# Patient Record
Sex: FEMALE | Race: BLACK OR AFRICAN AMERICAN | NOT HISPANIC OR LATINO
[De-identification: names, ages, dates, MRNs, and addresses within clinical notes are randomized per-mention and may not be internally consistent; named-entity substitution may affect disease eponyms.]

---

## 2017-01-16 ENCOUNTER — RESULT REVIEW (OUTPATIENT)
Age: 47
End: 2017-01-16

## 2018-01-24 ENCOUNTER — RESULT REVIEW (OUTPATIENT)
Age: 48
End: 2018-01-24

## 2018-10-02 ENCOUNTER — APPOINTMENT (OUTPATIENT)
Dept: OTHER | Facility: CLINIC | Age: 48
End: 2018-10-02
Payer: COMMERCIAL

## 2018-10-02 ENCOUNTER — LABORATORY RESULT (OUTPATIENT)
Age: 48
End: 2018-10-02

## 2018-10-02 ENCOUNTER — TRANSCRIPTION ENCOUNTER (OUTPATIENT)
Age: 48
End: 2018-10-02

## 2018-10-02 VITALS
RESPIRATION RATE: 15 BRPM | WEIGHT: 181 LBS | SYSTOLIC BLOOD PRESSURE: 133 MMHG | HEIGHT: 67 IN | BODY MASS INDEX: 28.41 KG/M2 | OXYGEN SATURATION: 100 % | DIASTOLIC BLOOD PRESSURE: 84 MMHG | HEART RATE: 79 BPM

## 2018-10-02 DIAGNOSIS — I10 ESSENTIAL (PRIMARY) HYPERTENSION: ICD-10-CM

## 2018-10-02 DIAGNOSIS — Z28.89 IMMUNIZATION NOT CARRIED OUT FOR OTHER REASON: ICD-10-CM

## 2018-10-02 DIAGNOSIS — E03.9 HYPOTHYROIDISM, UNSPECIFIED: ICD-10-CM

## 2018-10-02 PROCEDURE — 99386 PREV VISIT NEW AGE 40-64: CPT

## 2018-10-02 PROCEDURE — 96150: CPT

## 2018-10-02 PROCEDURE — 94060 EVALUATION OF WHEEZING: CPT

## 2018-10-03 LAB
ALBUMIN SERPL ELPH-MCNC: 4.3 G/DL
ALP BLD-CCNC: 80 U/L
ALT SERPL-CCNC: 10 U/L
ANION GAP SERPL CALC-SCNC: 14 MMOL/L
APPEARANCE: CLEAR
AST SERPL-CCNC: 18 U/L
BACTERIA: NEGATIVE
BASOPHILS # BLD AUTO: 0.02 K/UL
BASOPHILS NFR BLD AUTO: 0.9 %
BILIRUB SERPL-MCNC: 0.4 MG/DL
BILIRUBIN URINE: NEGATIVE
BLOOD URINE: NEGATIVE
BUN SERPL-MCNC: 10 MG/DL
CALCIUM SERPL-MCNC: 9.6 MG/DL
CHLORIDE SERPL-SCNC: 104 MMOL/L
CHOLEST SERPL-MCNC: 213 MG/DL
CHOLEST/HDLC SERPL: 2.6 RATIO
CO2 SERPL-SCNC: 21 MMOL/L
COLOR: YELLOW
CREAT SERPL-MCNC: 0.82 MG/DL
EOSINOPHIL # BLD AUTO: 0.02 K/UL
EOSINOPHIL NFR BLD AUTO: 0.9 %
GLUCOSE QUALITATIVE U: NEGATIVE MG/DL
GLUCOSE SERPL-MCNC: 87 MG/DL
HCT VFR BLD CALC: 36.1 %
HDLC SERPL-MCNC: 82 MG/DL
HGB BLD-MCNC: 12 G/DL
HYALINE CASTS: 0 /LPF
IMM GRANULOCYTES NFR BLD AUTO: 0 %
KETONES URINE: NEGATIVE
LDLC SERPL CALC-MCNC: 120 MG/DL
LEUKOCYTE ESTERASE URINE: NEGATIVE
LYMPHOCYTES # BLD AUTO: 1.06 K/UL
LYMPHOCYTES NFR BLD AUTO: 46.7 %
MAN DIFF?: NORMAL
MCHC RBC-ENTMCNC: 29.9 PG
MCHC RBC-ENTMCNC: 33.2 GM/DL
MCV RBC AUTO: 90 FL
MICROSCOPIC-UA: NORMAL
MONOCYTES # BLD AUTO: 0.46 K/UL
MONOCYTES NFR BLD AUTO: 20.3 %
NEUTROPHILS # BLD AUTO: 0.71 K/UL
NEUTROPHILS NFR BLD AUTO: 31.2 %
NITRITE URINE: NEGATIVE
PH URINE: 7.5
PLATELET # BLD AUTO: 235 K/UL
POTASSIUM SERPL-SCNC: 4.2 MMOL/L
PROT SERPL-MCNC: 7.7 G/DL
PROTEIN URINE: ABNORMAL MG/DL
RBC # BLD: 4.01 M/UL
RBC # FLD: 15.1 %
RED BLOOD CELLS URINE: 8 /HPF
SODIUM SERPL-SCNC: 139 MMOL/L
SPECIFIC GRAVITY URINE: 1.03
SQUAMOUS EPITHELIAL CELLS: 12 /HPF
TRIGL SERPL-MCNC: 53 MG/DL
UROBILINOGEN URINE: NEGATIVE MG/DL
WBC # FLD AUTO: 2.27 K/UL
WHITE BLOOD CELLS URINE: 5 /HPF

## 2019-01-04 ENCOUNTER — RECORD ABSTRACTING (OUTPATIENT)
Age: 49
End: 2019-01-04

## 2019-01-04 DIAGNOSIS — Z83.3 FAMILY HISTORY OF DIABETES MELLITUS: ICD-10-CM

## 2019-01-04 LAB — CYTOLOGY CVX/VAG DOC THIN PREP: NORMAL

## 2019-01-29 ENCOUNTER — RESULT CHARGE (OUTPATIENT)
Age: 49
End: 2019-01-29

## 2019-01-29 ENCOUNTER — APPOINTMENT (OUTPATIENT)
Dept: OBGYN | Facility: CLINIC | Age: 49
End: 2019-01-29
Payer: COMMERCIAL

## 2019-01-29 VITALS
WEIGHT: 189 LBS | HEIGHT: 67 IN | BODY MASS INDEX: 29.66 KG/M2 | SYSTOLIC BLOOD PRESSURE: 126 MMHG | DIASTOLIC BLOOD PRESSURE: 80 MMHG

## 2019-01-29 DIAGNOSIS — Z87.42 PERSONAL HISTORY OF OTHER DISEASES OF THE FEMALE GENITAL TRACT: ICD-10-CM

## 2019-01-29 DIAGNOSIS — Z04.9 ENCOUNTER FOR EXAMINATION AND OBSERVATION FOR UNSPECIFIED REASON: ICD-10-CM

## 2019-01-29 LAB
BILIRUB UR QL STRIP: ABNORMAL
CLARITY UR: CLEAR
COLLECTION METHOD: NORMAL
GLUCOSE UR-MCNC: NORMAL
HCG UR QL: 0.2 EU/DL
HCG UR QL: NEGATIVE
HGB UR QL STRIP.AUTO: NORMAL
KETONES UR-MCNC: ABNORMAL
LEUKOCYTE ESTERASE UR QL STRIP: NORMAL
NITRITE UR QL STRIP: NORMAL
PH UR STRIP: 6
PROT UR STRIP-MCNC: ABNORMAL
QUALITY CONTROL: YES
SP GR UR STRIP: 1.02

## 2019-01-29 PROCEDURE — 99213 OFFICE O/P EST LOW 20 MIN: CPT | Mod: 25

## 2019-01-29 PROCEDURE — 81003 URINALYSIS AUTO W/O SCOPE: CPT | Mod: QW

## 2019-01-29 PROCEDURE — 82270 OCCULT BLOOD FECES: CPT

## 2019-01-29 PROCEDURE — 36415 COLL VENOUS BLD VENIPUNCTURE: CPT

## 2019-01-29 PROCEDURE — 99396 PREV VISIT EST AGE 40-64: CPT

## 2019-01-29 PROCEDURE — 81025 URINE PREGNANCY TEST: CPT

## 2019-01-29 RX ORDER — THYROID, PORCINE 30 MG/1
30 TABLET ORAL
Refills: 0 | Status: COMPLETED | COMMUNITY
Start: 2018-10-02 | End: 2019-01-29

## 2019-01-29 RX ORDER — LISINOPRIL AND HYDROCHLOROTHIAZIDE TABLETS 20; 12.5 MG/1; MG/1
20-12.5 TABLET ORAL
Refills: 0 | Status: COMPLETED | COMMUNITY
Start: 2018-10-02 | End: 2019-01-29

## 2019-01-30 LAB
C TRACH RRNA SPEC QL NAA+PROBE: NOT DETECTED
HAV IGM SER QL: NONREACTIVE
HBV CORE IGM SER QL: NONREACTIVE
HBV SURFACE AG SER QL: NONREACTIVE
HCV AB SER QL: NONREACTIVE
HCV S/CO RATIO: 0.13 S/CO
HIV1+2 AB SPEC QL IA.RAPID: NONREACTIVE
HPV HIGH+LOW RISK DNA PNL CVX: NOT DETECTED
HSV 1+2 IGG SER IA-IMP: NEGATIVE
HSV 1+2 IGG SER IA-IMP: POSITIVE
HSV1 IGG SER QL: 46.4 INDEX
HSV2 IGG SER QL: 0.09 INDEX
N GONORRHOEA RRNA SPEC QL NAA+PROBE: NOT DETECTED
SOURCE AMPLIFICATION: NORMAL
SOURCE TP AMPLIFICATION: NORMAL
T PALLIDUM AB SER QL IA: NEGATIVE
T VAGINALIS RRNA SPEC QL NAA+PROBE: NOT DETECTED

## 2019-01-30 NOTE — COUNSELING
[Breast Self Exam] : breast self exam [Nutrition] : nutrition [Exercise] : exercise [Vitamins/Supplements] : vitamins/supplements [Contraception] : contraception [Sunscreen] : sunscreen [Safe Sexual Practices] : safe sexual practices [Weight Management] : weight management [STD (testing, results, tx)] : STD (testing, results, tx)

## 2019-01-30 NOTE — PHYSICAL EXAM
[Awake] : awake [Alert] : alert [Soft] : soft [Oriented x3] : oriented to person, place, and time [No Bleeding] : there was no active vaginal bleeding [Uterine Adnexae] : were not tender and not enlarged [Normal rectal exam] : was normal [Normal Brown Stool] : was normal and brown [None] : there was no rectal mass  [Labia Majora] : labia major [Labia Minora] : labia minora [Normal] : clitoris [No Tenderness] : no rectal tenderness [Nl Sphincter Tone] : normal sphincter tone [Enlarged ___ wks] : enlarged [unfilled] ~Uweeks [Acute Distress] : no acute distress [Mass] : no breast mass [Nipple Discharge] : no nipple discharge [Axillary LAD] : no axillary lymphadenopathy [Tender] : non tender [Occult Blood] : occult blood test from digital rectal exam was negative [Internal Hemorrhoid] : no internal hemorrhoids were present [External Hemorrhoid] : no external hemorrhoids were present [Skin Tags] : no residual hemorrhoidal skin tags [FreeTextEntry6] : normal

## 2019-01-30 NOTE — DISCUSSION/SUMMARY
[Condoms] : condom use [Pregnancy Prevention] : pregnancy prevention [FreeTextEntry1] : Pt will schedule pelvic sono to monitor fibroid size, along with further evaluate on and off left abdominal/pelvic region discomfort. Pt to RTO annual yearly.  All the pt's questions and concerns were addressed.

## 2019-01-30 NOTE — HISTORY OF PRESENT ILLNESS
[Current] : cancer screening reviewed and current [Last Pap ___] : Last cervical pap smear was [unfilled] [Last Pap Smear ___] : last Papanicolaou cytology done [unfilled] [Last Mammogram ___] : last mammogram done [unfilled] [Sexually Active] : is sexually active [Monogamous] : is monogamous [Contraception] : uses contraception [Male ___] : [unfilled] male [Definite:  ___ (Date)] : the last menstrual period was [unfilled] [Menarche Age: ____] : age at menarche was [unfilled] [de-identified] : [facility] 1/24/18 LISAL [de-identified] : [facility] 1/24/18 NEG [de-identified] : [facility] 1/18/18 B2

## 2019-02-01 LAB — CYTOLOGY CVX/VAG DOC THIN PREP: NORMAL

## 2019-02-12 ENCOUNTER — OTHER (OUTPATIENT)
Age: 49
End: 2019-02-12

## 2019-02-12 ENCOUNTER — APPOINTMENT (OUTPATIENT)
Dept: OBGYN | Facility: CLINIC | Age: 49
End: 2019-02-12
Payer: COMMERCIAL

## 2019-02-12 VITALS
BODY MASS INDEX: 29.74 KG/M2 | SYSTOLIC BLOOD PRESSURE: 120 MMHG | WEIGHT: 189.5 LBS | HEIGHT: 67 IN | DIASTOLIC BLOOD PRESSURE: 76 MMHG

## 2019-02-12 PROCEDURE — 99213 OFFICE O/P EST LOW 20 MIN: CPT | Mod: 25

## 2019-02-12 PROCEDURE — 76830 TRANSVAGINAL US NON-OB: CPT | Mod: 59

## 2019-02-12 PROCEDURE — 76857 US EXAM PELVIC LIMITED: CPT

## 2019-02-12 NOTE — HISTORY OF PRESENT ILLNESS
[Last Pap ___] : Last cervical pap smear was [unfilled] [Last Mammogram ___] : Last Mammogram was [unfilled] [Definite:  ___ (Date)] : the last menstrual period was [unfilled] [Normal Amount/Duration] : was of a normal amount and duration [Regular Cycle Intervals] : periods have been regular [Menarche Age: ____] : age at menarche was [unfilled] [Monogamous] : is monogamous [Male ___] : [unfilled] male [de-identified] : [facility] 2/12/19 [Sexually Active] : is not sexually active [Contraception] : does not use contraception

## 2019-02-12 NOTE — DISCUSSION/SUMMARY
[FreeTextEntry1] : Pt aware of results of recent pelvic sono, stable fibroids.  Discussed recent STD screen results.  Pt to RTO annual 1 year.  All the pt's questions and concerns were addressed.

## 2019-02-12 NOTE — COUNSELING
[STD (testing, results, tx)] : STD (testing, results, tx) [FreeTextEntry2] : Pt informed recent results, negative.

## 2019-02-25 ENCOUNTER — RX RENEWAL (OUTPATIENT)
Age: 49
End: 2019-02-25

## 2019-02-25 ENCOUNTER — TRANSCRIPTION ENCOUNTER (OUTPATIENT)
Age: 49
End: 2019-02-25

## 2019-04-19 ENCOUNTER — TRANSCRIPTION ENCOUNTER (OUTPATIENT)
Age: 49
End: 2019-04-19

## 2019-09-03 ENCOUNTER — APPOINTMENT (OUTPATIENT)
Dept: OTHER | Facility: CLINIC | Age: 49
End: 2019-09-03
Payer: COMMERCIAL

## 2019-09-03 ENCOUNTER — LABORATORY RESULT (OUTPATIENT)
Age: 49
End: 2019-09-03

## 2019-09-03 VITALS
OXYGEN SATURATION: 97 % | WEIGHT: 185 LBS | HEART RATE: 66 BPM | RESPIRATION RATE: 16 BRPM | SYSTOLIC BLOOD PRESSURE: 133 MMHG | DIASTOLIC BLOOD PRESSURE: 83 MMHG | HEIGHT: 67 IN | BODY MASS INDEX: 29.03 KG/M2

## 2019-09-03 PROCEDURE — 99212 OFFICE O/P EST SF 10 MIN: CPT | Mod: 25

## 2019-09-03 PROCEDURE — 99396 PREV VISIT EST AGE 40-64: CPT | Mod: 25

## 2019-09-03 PROCEDURE — 94010 BREATHING CAPACITY TEST: CPT

## 2019-09-03 NOTE — HISTORY OF PRESENT ILLNESS
[FreeTextEntry1] : heartburn \par taking PPI \par  also PRN Zantrac \par  still has reflux though better with meds\par  no abd pain \par  no dysphagia \par  had EGD 10 02 2018

## 2019-09-03 NOTE — DISCUSSION/SUMMARY
[Patient seen for WTC Monitoring ___] : Patient was seen for WTC monitoring [unfilled] [Please See Note in Chart and Documentation in Trial DB] : Please see note in chart and documentation in Trial DB. 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\par PMH/ PSH LM removal \par Hypothyroid \par HTN\par CHEST TIGHTNESS AND BURNING IN THE CHEST \par  SOUR TASTE IN THE MOUTH \par ALL STARTED 02 2002\par HE SX BECAME WORSE AR 2007 WHEN SHE STARTED TAKING OTC NEXIUM ONCE A WEEK \par EVENTUALLY SAW GI DOCTOR IN 2014 AND WAS DIAGNOSED WITH GERD  AND STOMACH ULCER \par  SHE IS TREATED WITH OMEPRAZOLE SINCE 03 2016 AND RANITIDINE 02 2018 \par \par CHEST CT 02 2014 NL MAMMOGRAM 02 2018 stated that had another one this year \par SPIROMETRY NL \par  PAP Smear 02 2018 \par \par Meds: entered in [employer] \par  Non smoker \par a/p WTC MV \par  see Follow up Oc MEd note for details \par

## 2019-09-03 NOTE — PHYSICAL EXAM
[General Appearance - Alert] : alert [General Appearance - In No Acute Distress] : in no acute distress [Sclera] : the sclera and conjunctiva were normal [PERRL With Normal Accommodation] : pupils were equal in size, round, and reactive to light [Extraocular Movements] : extraocular movements were intact [Outer Ear] : the ears and nose were normal in appearance [Oropharynx] : the oropharynx was normal [Neck Appearance] : the appearance of the neck was normal [Jugular Venous Distention Increased] : there was no jugular-venous distention [Neck Cervical Mass (___cm)] : no neck mass was observed [Thyroid Nodule] : there were no palpable thyroid nodules [Thyroid Diffuse Enlargement] : the thyroid was not enlarged [Auscultation Breath Sounds / Voice Sounds] : lungs were clear to auscultation bilaterally [Bowel Sounds] : normal bowel sounds [Abdomen Tenderness] : non-tender [Abdomen Soft] : soft [] : no hepato-splenomegaly [Abdomen Mass (___ Cm)] : no abdominal mass palpated

## 2019-09-03 NOTE — DISCUSSION/SUMMARY
[Patient seen for WTC Monitoring ___] : Patient was seen for WTC monitoring [unfilled] [Please See Note in Chart and Documentation in Trial DB] : Please see note in chart and documentation in Trial DB. [FreeTextEntry3] : ELYSE LAMB \par  at [facility]\par WTC exposure 	 Sep--	 Oct-\par \par         \par  	              Sunday Monday Tuesday Wed Thur Friday	Saturday\par   Sept 9- Sept 15	   	   	   	   	   	   	   \par  Sept 16 - Sept 22	   	 15	 15	 15	 15	 15	 15\par  Sept 23 - Sept 29	 0	 0	 15	 0	 15	 0	 15\par 4 days in 10 2001 15 hours \par  Doing  perimeter security and WBB adjacent to pile/pit\par PMH/ PSH LM removal \par Hypothyroid \par HTN\par CHEST TIGHTNESS AND BURNING IN THE CHEST \par  SOUR TASTE IN THE MOUTH \par ALL STARTED 02 2002\par HE SX BECAME WORSE AR 2007 WHEN SHE STARTED TAKING OTC NEXIUM ONCE A WEEK \par EVENTUALLY SAW GI DOCTOR IN 2014 AND WAS DIAGNOSED WITH GERD  AND STOMACH ULCER \par  SHE IS TREATED WITH OMEPRAZOLE SINCE 03 2016 AND RANITIDINE 02 2018 \par \par CHEST CT 02 2014 NL MAMMOGRAM 02 2018 stated that had another one this year \par SPIROMETRY NL \par  PAP Smear 02 2018 \par \par Meds: entered in [employer] \par  Non smoker \par a/p WTC MV \par  see Follow up Oc MEd note for details \par

## 2019-09-03 NOTE — PHYSICAL EXAM
[General Appearance - Alert] : alert [General Appearance - In No Acute Distress] : in no acute distress [Sclera] : the sclera and conjunctiva were normal [PERRL With Normal Accommodation] : pupils were equal in size, round, and reactive to light [Extraocular Movements] : extraocular movements were intact [Oropharynx] : the oropharynx was normal [Outer Ear] : the ears and nose were normal in appearance [Neck Appearance] : the appearance of the neck was normal [Neck Cervical Mass (___cm)] : no neck mass was observed [Jugular Venous Distention Increased] : there was no jugular-venous distention [Thyroid Diffuse Enlargement] : the thyroid was not enlarged [Thyroid Nodule] : there were no palpable thyroid nodules [Auscultation Breath Sounds / Voice Sounds] : lungs were clear to auscultation bilaterally [Bowel Sounds] : normal bowel sounds [Abdomen Tenderness] : non-tender [Abdomen Soft] : soft [] : no hepato-splenomegaly [Abdomen Mass (___ Cm)] : no abdominal mass palpated

## 2019-09-04 LAB
ALBUMIN SERPL ELPH-MCNC: 4.2 G/DL
ALP BLD-CCNC: 80 U/L
ALT SERPL-CCNC: 16 U/L
ANION GAP SERPL CALC-SCNC: 15 MMOL/L
APPEARANCE: CLEAR
AST SERPL-CCNC: 23 U/L
BACTERIA: NEGATIVE
BASOPHILS # BLD AUTO: 0.02 K/UL
BASOPHILS NFR BLD AUTO: 0.7 %
BILIRUB SERPL-MCNC: 0.2 MG/DL
BILIRUBIN URINE: NEGATIVE
BLOOD URINE: NEGATIVE
BUN SERPL-MCNC: 13 MG/DL
CALCIUM SERPL-MCNC: 9.7 MG/DL
CHLORIDE SERPL-SCNC: 101 MMOL/L
CHOLEST SERPL-MCNC: 186 MG/DL
CHOLEST/HDLC SERPL: 2.4 RATIO
CO2 SERPL-SCNC: 23 MMOL/L
COLOR: NORMAL
CREAT SERPL-MCNC: 0.78 MG/DL
EOSINOPHIL # BLD AUTO: 0.05 K/UL
EOSINOPHIL NFR BLD AUTO: 1.8 %
GLUCOSE QUALITATIVE U: NEGATIVE
GLUCOSE SERPL-MCNC: 81 MG/DL
HCT VFR BLD CALC: 38.2 %
HDLC SERPL-MCNC: 78 MG/DL
HGB BLD-MCNC: 11.9 G/DL
HYALINE CASTS: 1 /LPF
IMM GRANULOCYTES NFR BLD AUTO: 0.4 %
KETONES URINE: NEGATIVE
LDLC SERPL CALC-MCNC: 100 MG/DL
LEUKOCYTE ESTERASE URINE: NEGATIVE
LYMPHOCYTES # BLD AUTO: 1.41 K/UL
LYMPHOCYTES NFR BLD AUTO: 49.8 %
MAN DIFF?: NORMAL
MCHC RBC-ENTMCNC: 30.6 PG
MCHC RBC-ENTMCNC: 31.2 GM/DL
MCV RBC AUTO: 98.2 FL
MICROSCOPIC-UA: NORMAL
MONOCYTES # BLD AUTO: 0.35 K/UL
MONOCYTES NFR BLD AUTO: 12.4 %
NEUTROPHILS # BLD AUTO: 0.99 K/UL
NEUTROPHILS NFR BLD AUTO: 34.9 %
NITRITE URINE: NEGATIVE
PH URINE: 6.5
PLATELET # BLD AUTO: 221 K/UL
POTASSIUM SERPL-SCNC: 4.1 MMOL/L
PROT SERPL-MCNC: 7.7 G/DL
PROTEIN URINE: NEGATIVE
RBC # BLD: 3.89 M/UL
RBC # FLD: 13.7 %
RED BLOOD CELLS URINE: 1 /HPF
SODIUM SERPL-SCNC: 139 MMOL/L
SPECIFIC GRAVITY URINE: 1.02
SQUAMOUS EPITHELIAL CELLS: 1 /HPF
TRIGL SERPL-MCNC: 42 MG/DL
UROBILINOGEN URINE: NORMAL
WBC # FLD AUTO: 2.83 K/UL
WHITE BLOOD CELLS URINE: 0 /HPF

## 2019-10-02 ENCOUNTER — APPOINTMENT (OUTPATIENT)
Dept: OBGYN | Facility: CLINIC | Age: 49
End: 2019-10-02
Payer: COMMERCIAL

## 2019-10-02 VITALS
BODY MASS INDEX: 29.03 KG/M2 | WEIGHT: 185 LBS | HEIGHT: 67 IN | DIASTOLIC BLOOD PRESSURE: 82 MMHG | SYSTOLIC BLOOD PRESSURE: 122 MMHG

## 2019-10-02 LAB
BILIRUB UR QL STRIP: NORMAL
GLUCOSE UR-MCNC: NORMAL
HCG UR QL: 0.2 EU/DL
HGB UR QL STRIP.AUTO: NORMAL
KETONES UR-MCNC: NORMAL
LEUKOCYTE ESTERASE UR QL STRIP: ABNORMAL
NITRITE UR QL STRIP: NORMAL
PH UR STRIP: 7.5
PROT UR STRIP-MCNC: NORMAL
SP GR UR STRIP: 1.01

## 2019-10-02 PROCEDURE — 81003 URINALYSIS AUTO W/O SCOPE: CPT | Mod: QW

## 2019-10-02 PROCEDURE — 99213 OFFICE O/P EST LOW 20 MIN: CPT

## 2019-10-02 NOTE — PHYSICAL EXAM
[Normal] : uterus [Discharge] : a  ~M vaginal discharge was present [Heavy] : heavy [Foul Smelling] : foul smelling [Curds] : curd-like [No Bleeding] : there was no active vaginal bleeding [Uterine Adnexae] : were not tender and not enlarged

## 2019-10-02 NOTE — HISTORY OF PRESENT ILLNESS
[___ Year(s) Ago] : [unfilled] year(s) ago [Good] : being in good health [Healthy Diet] : a healthy diet [Regular Exercise] : regular exercise [Last Mammogram ___] : Last Mammogram was [unfilled] [Last Pap ___] : Last cervical pap smear was [unfilled] [Perimenopausal] : is perimenopausal [Pregnancy History] : pregnancy history: [Definite:  ___ (Date)] : the last menstrual period was [unfilled] [Menarche Age: ____] : age at menarche was [unfilled] [Sexually Active] : is sexually active [Monogamous] : is monogamous [Male ___] : [unfilled] male [Menstrual Problems] : reports normal menses [de-identified] : breast u/s-1/18/18 b2 [Contraception] : does not use contraception

## 2019-10-02 NOTE — COUNSELING
[STD (testing, results, tx)] : STD (testing, results, tx) [Contraception] : contraception [Safe Sexual Practices] : safe sexual practices [Vulvar Hygiene] : vulvar hygiene

## 2019-10-08 LAB
C TRACH RRNA SPEC QL NAA+PROBE: NOT DETECTED
CANDIDA VAG CYTO: NOT DETECTED
G VAGINALIS+PREV SP MTYP VAG QL MICRO: NOT DETECTED
N GONORRHOEA RRNA SPEC QL NAA+PROBE: NOT DETECTED
SOURCE AMPLIFICATION: NORMAL
T VAGINALIS VAG QL WET PREP: NOT DETECTED

## 2020-02-06 ENCOUNTER — APPOINTMENT (OUTPATIENT)
Dept: OBGYN | Facility: CLINIC | Age: 50
End: 2020-02-06
Payer: COMMERCIAL

## 2020-02-06 ENCOUNTER — ASOB RESULT (OUTPATIENT)
Age: 50
End: 2020-02-06

## 2020-02-06 VITALS
HEIGHT: 67 IN | SYSTOLIC BLOOD PRESSURE: 126 MMHG | WEIGHT: 186 LBS | DIASTOLIC BLOOD PRESSURE: 72 MMHG | BODY MASS INDEX: 29.19 KG/M2

## 2020-02-06 DIAGNOSIS — B37.3 CANDIDIASIS OF VULVA AND VAGINA: ICD-10-CM

## 2020-02-06 DIAGNOSIS — Z00.00 ENCOUNTER FOR GENERAL ADULT MEDICAL EXAMINATION W/OUT ABNORMAL FINDINGS: ICD-10-CM

## 2020-02-06 DIAGNOSIS — B96.89 ACUTE VAGINITIS: ICD-10-CM

## 2020-02-06 DIAGNOSIS — Z01.419 ENCOUNTER FOR GYNECOLOGICAL EXAMINATION (GENERAL) (ROUTINE) W/OUT ABNORMAL FINDINGS: ICD-10-CM

## 2020-02-06 DIAGNOSIS — Z80.49 FAMILY HISTORY OF MALIGNANT NEOPLASM OF OTHER GENITAL ORGANS: ICD-10-CM

## 2020-02-06 DIAGNOSIS — N89.8 OTHER SPECIFIED NONINFLAMMATORY DISORDERS OF VAGINA: ICD-10-CM

## 2020-02-06 DIAGNOSIS — N76.0 ACUTE VAGINITIS: ICD-10-CM

## 2020-02-06 DIAGNOSIS — Z78.9 OTHER SPECIFIED HEALTH STATUS: ICD-10-CM

## 2020-02-06 LAB
BILIRUB UR QL STRIP: NORMAL
GLUCOSE UR-MCNC: NORMAL
HCG UR QL: 0.2 EU/DL
HGB UR QL STRIP.AUTO: ABNORMAL
KETONES UR-MCNC: NORMAL
LEUKOCYTE ESTERASE UR QL STRIP: NORMAL
NITRITE UR QL STRIP: NORMAL
PH UR STRIP: 7
PROT UR STRIP-MCNC: ABNORMAL
SP GR UR STRIP: 1.02

## 2020-02-06 PROCEDURE — 76830 TRANSVAGINAL US NON-OB: CPT

## 2020-02-06 PROCEDURE — 82270 OCCULT BLOOD FECES: CPT

## 2020-02-06 PROCEDURE — 81003 URINALYSIS AUTO W/O SCOPE: CPT | Mod: QW

## 2020-02-06 PROCEDURE — 99396 PREV VISIT EST AGE 40-64: CPT

## 2020-02-06 RX ORDER — METRONIDAZOLE 7.5 MG/G
0.75 GEL VAGINAL
Qty: 1 | Refills: 1 | Status: DISCONTINUED | COMMUNITY
Start: 2019-10-02 | End: 2020-02-06

## 2020-02-06 RX ORDER — MELATONIN 3 MG
TABLET ORAL
Refills: 0 | Status: ACTIVE | COMMUNITY

## 2020-02-06 RX ORDER — FLUCONAZOLE 150 MG/1
150 TABLET ORAL
Qty: 2 | Refills: 1 | Status: DISCONTINUED | COMMUNITY
Start: 2019-10-02 | End: 2020-02-06

## 2020-02-08 LAB
DATE COLLECTED: NORMAL
HEMOCCULT SP1 STL QL: NEGATIVE
HPV HIGH+LOW RISK DNA PNL CVX: NOT DETECTED
QUALITY CONTROL: YES

## 2020-02-09 NOTE — DISCUSSION/SUMMARY
[Condoms] : condom use [Pregnancy Prevention] : pregnancy prevention [FreeTextEntry1] : Pt aware fivbroids are stable.  Pt's menses is regular, keeping menstrual calender.  All the pt's questions and concerns were addressed.

## 2020-02-09 NOTE — HISTORY OF PRESENT ILLNESS
[Healthy Diet] : a healthy diet [Good] : being in good health [Regular Exercise] : regular exercise [Last Mammogram ___] : Last Mammogram was [unfilled] [Last Pap ___] : Last cervical pap smear was [unfilled] [Perimenopausal] : is perimenopausal [Pregnancy History] : pregnancy history: [Total Preg ___] : [unfilled] [Living ___] : [unfilled] [AB Induced ___] : elective abortions: [unfilled] [Monogamous (Male Partner)] : is monogamous with a male partner [Definite:  ___ (Date)] : the last menstrual period was [unfilled] [Menarche Age: ____] : age at menarche was [unfilled] [Sexually Active] : is sexually active [Monogamous] : is monogamous [Contraception] : uses contraception [Condoms] : uses condoms [Male ___] : [unfilled] male [Weight Concerns] : no concerns with her weight [Menstrual Problems] : reports normal menses [de-identified] : BREAST U/S 1/31/2020 AS PER PT,  PELVIC U/S 1/24/2017,  G/C 10/02/2019 NEG [Hot Flashes] : no hot flashes [Night Sweats] : no night sweats [Vaginal Itching] : no vaginal itching [Mood Changes] : no mood changes [Dyspareunia] : no dyspareunia

## 2020-02-09 NOTE — PHYSICAL EXAM
[Awake] : awake [Alert] : alert [Soft] : soft [Oriented x3] : oriented to person, place, and time [Labia Majora] : labia major [Labia Minora] : labia minora [No Bleeding] : there was no active vaginal bleeding [Normal] : clitoris [Uterine Adnexae] : were not tender and not enlarged [No Tenderness] : no rectal tenderness [Nl Sphincter Tone] : normal sphincter tone [Enlarged ___ wks] : enlarged [unfilled] ~Uweeks [Acute Distress] : no acute distress [Mass] : no breast mass [Axillary LAD] : no axillary lymphadenopathy [Nipple Discharge] : no nipple discharge [Occult Blood] : occult blood test from digital rectal exam was negative [Tender] : non tender [FreeTextEntry6] : normal

## 2020-02-11 LAB — CYTOLOGY CVX/VAG DOC THIN PREP: NORMAL

## 2020-07-13 ENCOUNTER — RX RENEWAL (OUTPATIENT)
Age: 50
End: 2020-07-13

## 2020-11-09 ENCOUNTER — RX RENEWAL (OUTPATIENT)
Age: 50
End: 2020-11-09

## 2020-11-19 ENCOUNTER — APPOINTMENT (OUTPATIENT)
Dept: OBGYN | Facility: CLINIC | Age: 50
End: 2020-11-19
Payer: COMMERCIAL

## 2020-11-19 VITALS
HEIGHT: 67 IN | SYSTOLIC BLOOD PRESSURE: 124 MMHG | WEIGHT: 186 LBS | BODY MASS INDEX: 29.19 KG/M2 | DIASTOLIC BLOOD PRESSURE: 80 MMHG

## 2020-11-19 PROCEDURE — 57150 TREAT VAGINA INFECTION: CPT

## 2020-11-19 PROCEDURE — 99213 OFFICE O/P EST LOW 20 MIN: CPT | Mod: 25

## 2020-11-19 RX ORDER — AMLODIPINE BESYLATE 5 MG/1
5 TABLET ORAL
Refills: 0 | Status: DISCONTINUED | COMMUNITY
Start: 2018-10-02 | End: 2020-11-19

## 2020-11-19 RX ORDER — FAMOTIDINE 20 MG/1
20 TABLET, FILM COATED ORAL TWICE DAILY
Qty: 60 | Refills: 5 | Status: DISCONTINUED | COMMUNITY
Start: 2020-04-21 | End: 2020-11-19

## 2020-11-28 LAB
CANDIDA VAG CYTO: NOT DETECTED
G VAGINALIS+PREV SP MTYP VAG QL MICRO: NOT DETECTED
T VAGINALIS VAG QL WET PREP: NOT DETECTED

## 2020-11-28 NOTE — HISTORY OF PRESENT ILLNESS
[Pregnancy History] : pregnancy history: [Total Preg ___] : [unfilled] [AB Induced ___] : elective abortions: [unfilled] [Definite:  ___ (Date)] : the last menstrual period was [unfilled] [Sexually Active] : is sexually active [Monogamous] : is monogamous [Male ___] : [unfilled] male [Menstrual Problems] : reports normal menses [Contraception] : does not use contraception

## 2020-11-28 NOTE — PHYSICAL EXAM
[Labia Majora] : labia major [Labia Minora] : labia minora [Normal] : clitoris [No Bleeding] : there was no active vaginal bleeding [FreeTextEntry4] : moderate amount thick white discharge, vault irrigated

## 2020-11-28 NOTE — DISCUSSION/SUMMARY
[FreeTextEntry1] : Pt aware pending culture results any further tx.  All the pt's questions and concerns were addressed.

## 2020-12-23 PROBLEM — Z01.419 ENCOUNTER FOR ANNUAL ROUTINE GYNECOLOGICAL EXAMINATION: Status: RESOLVED | Noted: 2019-01-29 | Resolved: 2020-12-23

## 2021-02-09 ENCOUNTER — APPOINTMENT (OUTPATIENT)
Dept: OBGYN | Facility: CLINIC | Age: 51
End: 2021-02-09
Payer: COMMERCIAL

## 2021-02-09 VITALS
TEMPERATURE: 97.5 F | DIASTOLIC BLOOD PRESSURE: 68 MMHG | HEIGHT: 67 IN | SYSTOLIC BLOOD PRESSURE: 110 MMHG | BODY MASS INDEX: 31.39 KG/M2 | WEIGHT: 200 LBS

## 2021-02-09 DIAGNOSIS — Z11.3 ENCOUNTER FOR SCREENING FOR INFECTIONS WITH A PREDOMINANTLY SEXUAL MODE OF TRANSMISSION: ICD-10-CM

## 2021-02-09 DIAGNOSIS — Z78.0 ASYMPTOMATIC MENOPAUSAL STATE: ICD-10-CM

## 2021-02-09 DIAGNOSIS — Z87.42 PERSONAL HISTORY OF OTHER DISEASES OF THE FEMALE GENITAL TRACT: ICD-10-CM

## 2021-02-09 DIAGNOSIS — Z82.49 FAMILY HISTORY OF ISCHEMIC HEART DISEASE AND OTHER DISEASES OF THE CIRCULATORY SYSTEM: ICD-10-CM

## 2021-02-09 PROCEDURE — 99072 ADDL SUPL MATRL&STAF TM PHE: CPT

## 2021-02-09 PROCEDURE — 99396 PREV VISIT EST AGE 40-64: CPT

## 2021-02-09 RX ORDER — LISINOPRIL 5 MG/1
5 TABLET ORAL
Refills: 0 | Status: ACTIVE | COMMUNITY

## 2021-02-09 RX ORDER — ATORVASTATIN CALCIUM 10 MG/1
10 TABLET, FILM COATED ORAL
Refills: 0 | Status: ACTIVE | COMMUNITY

## 2021-02-09 RX ORDER — ASCORBIC ACID 500 MG
TABLET ORAL
Refills: 0 | Status: ACTIVE | COMMUNITY

## 2021-02-09 RX ORDER — AMLODIPINE BESYLATE 10 MG/1
10 TABLET ORAL
Refills: 0 | Status: ACTIVE | COMMUNITY

## 2021-02-09 NOTE — PHYSICAL EXAM
[Appropriately responsive] : appropriately responsive [Alert] : alert [No Acute Distress] : no acute distress [Oriented x3] : oriented x3 [Examination Of The Breasts] : a normal appearance [No Masses] : no breast masses were palpable [No Discharge] : no discharge [No Lesions] : no lesions  [Labia Majora] : normal [Labia Minora] : normal [No Bleeding] : There was no active vaginal bleeding [Normal] : normal [Normal Position] : in a normal position [Uterine Adnexae] : normal

## 2021-02-09 NOTE — HISTORY OF PRESENT ILLNESS
[perimenopausal] : perimenopausal [N] : Patient does not use contraception [Y] : Positive pregnancy history [Ultrasound] : ultrasound [Menarche Age: ____] : age at menarche was [unfilled] [Currently Active] : currently active [Men] : men [Vaginal] : vaginal [No] : No [Patient refuses STI testing] : Patient refuses STI testing [Mammogramdate] : 1/31/2020 [TextBox_19] : BR 1 [BreastSonogramDate] : 1/31/2020 [PapSmeardate] : 2/6/2020 [TextBox_25] : BR 2 [TextBox_31] : Negative [BoneDensityDate] : NA [ColonoscopyDate] : 2/2021 [HPVDate] : 2/6/2020 [TextBox_78] : Not detected [PGxTotal] : 1 [LMPDate] : 1/19/2021 [PGHxAbortions] : 1 [[facility] : 0 [PGHxABInduced] : 1 [TextBox_27] : Pelvic 2/6/2020 [FreeTextEntry1] : 1/19/2021

## 2021-02-18 LAB
CYTOLOGY CVX/VAG DOC THIN PREP: NORMAL
HPV HIGH+LOW RISK DNA PNL CVX: NOT DETECTED

## 2021-02-19 ENCOUNTER — APPOINTMENT (OUTPATIENT)
Dept: OTHER | Facility: CLINIC | Age: 51
End: 2021-02-19
Payer: COMMERCIAL

## 2021-02-19 PROCEDURE — 99442: CPT | Mod: 95

## 2021-02-19 PROCEDURE — 99396 PREV VISIT EST AGE 40-64: CPT | Mod: 95

## 2021-02-19 NOTE — DISCUSSION/SUMMARY
[Home] : at home, [unfilled] , at the time of the visit. [Other Location: e.g. Home (Enter Location, City,State)___] : at [unfilled] [Verbal consent obtained from patient] : the patient, [unfilled] [Patient seen for WTC Monitoring ___] : Patient was seen for WTC monitoring [unfilled] [Please See Note in Chart and Documentation in Trial DB] : Please see note in chart and documentation in Trial DB. [FreeTextEntry3] : ELYSE LAMB \par  at [facility]\par WTC exposure 	 Sep--	 Oct-\par \par         \par  	              Sunday Monday Tuesday Wed Thur Friday	Saturday\par   Sept 9- Sept 15	   	   	   	   	   	   	   \par  Sept 16 - Sept 22	   	 15	 15	 15	 15	 15	 15\par  Sept 23 - Sept 29	 0	 0	 15	 0	 15	 0	 15\par 4 days in 10 2001 15 hours \par  Doing  perimeter security and BB adjacent to pile/pit\par PMH/ PSH LM removal \par Hypothyroid \par HTN\par CHEST TIGHTNESS AND BURNING IN THE CHEST \par  SOUR TASTE IN THE MOUTH \par ALL STARTED 02 2002\par HE SX BECAME WORSE AR 2007 WHEN SHE STARTED TAKING OTC Nexium ONCE A WEEK \par EVENTUALLY SAW GI DOCTOR IN 2014 AND WAS DIAGNOSED WITH GERD  AND STOMACH ULCER \par  SHE IS TREATED WITH OMEPRAZOLE SINCE 03 2016 AND RANITIDINE 02 2018, now on famotidine \par \par \par \par  Non smoker \par a/p WTC MV \par  see Follow up Oc MEd note for details \par

## 2021-02-19 NOTE — HISTORY OF PRESENT ILLNESS
[Home] : at home, [unfilled] , at the time of the visit. [Other Location: e.g. Home (Enter Location, City,State)___] : at [unfilled] [Verbal consent obtained from patient] : the patient, [unfilled] [FreeTextEntry1] : heartburn \par taking OMEPRazole 40 mg daily \par  also PRN famotidine 20 mg bId \par  still has reflux though much  better controlled with meds\par  no abd pain \par  no dysphagia \par  had EGD 10 02 2018

## 2021-02-19 NOTE — ASSESSMENT
[FreeTextEntry1] : continue meds \par  GERD control with diet, weight loss and life style changes discussed \par \par  Foods and drinks that have been commonly linked to GERD symptoms include;\par \par •acidic foods, such as citrus fruits and tomatoes\par •alcoholic drinks\par •chocolate\par •coffee and other sources of caffeine\par •high-fat foods\par •mint\par •spicy foods\par Carbonated beverages \par Lifestyle Changes\par \par Avoid lying down for at least two hours after a meal or after drinking acidic beverages, like soda, or other caffeinated beverages. This can help to prevent stomach contents from flowing back into the esophagus. \par Keep your head elevated while you sleep. Using an extra pillow or two can also help to prevent reflux. \par Eat smaller and more frequent meals each day instead of a few large meals. This promotes digestion and can aid in preventing heartburn. \par Wear loose-fitting clothes to ease pressure on the stomach, which can worsen heartburn and reflux.\par \par Reduce excess weight around the midsection. This can ease pressure on the stomach. Such pressure can force some stomach contents back up the esophagus\par \par \par

## 2021-02-19 NOTE — DISCUSSION/SUMMARY
[Home] : at home, [unfilled] , at the time of the visit. [Other Location: e.g. Home (Enter Location, City,State)___] : at [unfilled] [Verbal consent obtained from patient] : the patient, [unfilled] [Patient seen for WTC Monitoring ___] : Patient was seen for WTC monitoring [unfilled] [Please See Note in Chart and Documentation in Trial DB] : Please see note in chart and documentation in Trial DB. [FreeTextEntry3] : ELYSE LAMB \par  at [facility]\par WTC exposure 	 Sep--	 Oct-\par \par         \par  	              Sunday Monday Tuesday Wed Thur Friday	Saturday\par   Sept 9- Sept 15	   	   	   	   	   	   	   \par  Sept 16 - Sept 22	   	 15	 15	 15	 15	 15	 15\par  Sept 23 - Sept 29	 0	 0	 15	 0	 15	 0	 15\par 4 days in 10 2001 15 hours \par  Doing  perimeter security and BB adjacent to pile/pit\par PMH/ PSH LM removal \par Hypothyroid \par HTN\par CHEST TIGHTNESS AND BURNING IN THE CHEST \par  SOUR TASTE IN THE MOUTH \par ALL STARTED 02 2002\par HE SX BECAME WORSE AR 2007 WHEN SHE STARTED TAKING OTC Nexium ONCE A WEEK \par EVENTUALLY SAW GI DOCTOR IN 2014 AND WAS DIAGNOSED WITH GERD  AND STOMACH ULCER \par  SHE IS TREATED WITH OMEPRAZOLE SINCE 03 2016 AND RANITIDINE 02 2018, now on famotidine \par \par \par \par  Non smoker \par a/p WTC MV \par  see Follow up Oc MEd note for details \par

## 2021-02-19 NOTE — HEALTH RISK ASSESSMENT
[Patient reported mammogram was normal] : Patient reported mammogram was normal [Patient reported PAP Smear was normal] : Patient reported PAP Smear was normal [Patient reported colonoscopy was normal] : Patient reported colonoscopy was normal [MammogramDate] : 2021 [PapSmearDate] : 2021 [ColonoscopyDate] : 2/2021

## 2021-10-06 PROBLEM — I10 ESSENTIAL HYPERTENSION: Status: ACTIVE | Noted: 2018-10-02

## 2022-01-06 ENCOUNTER — NON-APPOINTMENT (OUTPATIENT)
Age: 52
End: 2022-01-06

## 2022-02-09 ENCOUNTER — RX RENEWAL (OUTPATIENT)
Age: 52
End: 2022-02-09

## 2022-02-24 ENCOUNTER — APPOINTMENT (OUTPATIENT)
Dept: OBGYN | Facility: CLINIC | Age: 52
End: 2022-02-24
Payer: COMMERCIAL

## 2022-02-24 VITALS
DIASTOLIC BLOOD PRESSURE: 88 MMHG | HEIGHT: 67 IN | BODY MASS INDEX: 27.94 KG/M2 | WEIGHT: 178 LBS | SYSTOLIC BLOOD PRESSURE: 128 MMHG

## 2022-02-24 DIAGNOSIS — Z86.39 PERSONAL HISTORY OF OTHER ENDOCRINE, NUTRITIONAL AND METABOLIC DISEASE: ICD-10-CM

## 2022-02-24 DIAGNOSIS — Z87.2 PERSONAL HISTORY OF DISEASES OF THE SKIN AND SUBCUTANEOUS TISSUE: ICD-10-CM

## 2022-02-24 LAB
BILIRUB UR QL STRIP: NORMAL
GLUCOSE UR-MCNC: NORMAL
HCG UR QL: 0.2 EU/DL
HGB UR QL STRIP.AUTO: NORMAL
KETONES UR-MCNC: NORMAL
LEUKOCYTE ESTERASE UR QL STRIP: NORMAL
NITRITE UR QL STRIP: NORMAL
PH UR STRIP: 7
PROT UR STRIP-MCNC: NORMAL
SP GR UR STRIP: 1.01

## 2022-02-24 PROCEDURE — 99396 PREV VISIT EST AGE 40-64: CPT

## 2022-02-24 PROCEDURE — 81003 URINALYSIS AUTO W/O SCOPE: CPT | Mod: QW

## 2022-02-24 RX ORDER — TRIAMCINOLONE ACETONIDE 5 MG/G
CREAM TOPICAL
Refills: 0 | Status: ACTIVE | COMMUNITY

## 2022-02-24 NOTE — PHYSICAL EXAM
[Chaperone Present] : A chaperone was present in the examining room during all aspects of the physical examination [Appropriately responsive] : appropriately responsive [Alert] : alert [No Acute Distress] : no acute distress [Soft] : soft [Non-tender] : non-tender [Non-distended] : non-distended [Oriented x3] : oriented x3 [Examination Of The Breasts] : a normal appearance [No Discharge] : no discharge [No Masses] : no breast masses were palpable [Labia Majora] : normal [Labia Minora] : normal [Pink Rugae] : pink rugae [No Bleeding] : There was no active vaginal bleeding [Normal] : normal [Enlarged ___ wks] : enlarged [unfilled] ~Uweeks [Mass ___ cm] : a [unfilled] ~Ucm uterine mass was palpated [FreeTextEntry1] : KRYSTIN WAN [FreeTextEntry7] : Enlarged 20 wks size uterus (h/o fibroids). [Tenderness] : nontender [FreeTextEntry6] : Bulky, enlarged 20 wks size uterus (h/o fibroids). Bilateral adnexal fullness. Non-tender.

## 2022-02-24 NOTE — HISTORY OF PRESENT ILLNESS
[N] : Patient does not use contraception [Y] : Positive pregnancy history [Ultrasound] : ultrasound [Menarche Age: ____] : age at menarche was [unfilled] [No] : Patient does not have concerns regarding sex [Currently Active] : currently active [Men] : men [TextBox_4] : Pt presents for an Annual. [Mammogramdate] : 01/31/2020 [TextBox_19] : BR1 [Papeardate] : 02/09/2021 [TextBox_31] : Negative [LMPDate] : 02/17/2022 [PGxTotal] : 1 [PGHxAbortions] : 1 [facility]] : 0 [TextBox_27] : Pelvic: 02/06/2020 [FreeTextEntry1] : 02/17/2022

## 2022-02-24 NOTE — DISCUSSION/SUMMARY
[FreeTextEntry1] : -Annual well woman visit done today. Pap collected. Patient declines STI testing.\par \par -h/o uterine leiomyomas- On exam, bulky 20 wks size uterus. Pelvic sono ordered.\par \par -Patient states that she had a normal mammogram and breast ultrasound on 2/7/22 at , which was ordered by her PCP. No records available. Benign breast exam.\par \par -She recently had a normal colonoscopy and states that she was advised to follow up in 5 years.\par \par -She was advised to take vitamin D, calcium, and continue weightbearing exercises.\par \par -Recommend following up with dermatology for annual skin surveillance.\par \par -Follow up in 2-3 weeks with pelvic sono.\par \par All questions and concerns were discussed.

## 2022-03-01 LAB
C TRACH RRNA SPEC QL NAA+PROBE: NOT DETECTED
CANDIDA VAG CYTO: NOT DETECTED
G VAGINALIS+PREV SP MTYP VAG QL MICRO: NOT DETECTED
HPV HIGH+LOW RISK DNA PNL CVX: NOT DETECTED
N GONORRHOEA RRNA SPEC QL NAA+PROBE: NOT DETECTED
SOURCE TP AMPLIFICATION: NORMAL
T VAGINALIS VAG QL WET PREP: NOT DETECTED

## 2022-03-08 ENCOUNTER — RX RENEWAL (OUTPATIENT)
Age: 52
End: 2022-03-08

## 2022-03-09 ENCOUNTER — NON-APPOINTMENT (OUTPATIENT)
Age: 52
End: 2022-03-09

## 2022-03-14 ENCOUNTER — RX RENEWAL (OUTPATIENT)
Age: 52
End: 2022-03-14

## 2022-03-16 ENCOUNTER — APPOINTMENT (OUTPATIENT)
Dept: OTHER | Facility: CLINIC | Age: 52
End: 2022-03-16
Payer: COMMERCIAL

## 2022-03-16 DIAGNOSIS — K21.9 GASTRO-ESOPHAGEAL REFLUX DISEASE W/OUT ESOPHAGITIS: ICD-10-CM

## 2022-03-16 PROCEDURE — 99441: CPT | Mod: 95

## 2022-03-16 PROCEDURE — 99396 PREV VISIT EST AGE 40-64: CPT | Mod: 95

## 2022-03-16 RX ORDER — OMEPRAZOLE 40 MG/1
40 CAPSULE, DELAYED RELEASE ORAL
Qty: 30 | Refills: 11 | Status: DISCONTINUED | COMMUNITY
Start: 2018-10-02 | End: 2022-03-16

## 2022-03-16 NOTE — PAST MEDICAL HISTORY
[FreeTextEntry1] : patient is a  that was exposed to [facility] dust and debris after 09 11 2001\par \par

## 2022-03-16 NOTE — HISTORY OF PRESENT ILLNESS
[Home] : at home, [unfilled] , at the time of the visit. [Medical Office: ([facility])___] : at the medical office located in  [FreeTextEntry1] : heartburn \par taking OMEPRazole 40 mg daily \par  also PRN famotidine 20 mg bId \par  still has reflux though much  better controlled with meds\par  no abd pain \par  no dysphagia \par  had EGD 10 02 2018\par no dysphagia \par \par

## 2022-03-16 NOTE — PAST MEDICAL HISTORY
[FreeTextEntry1] : patient is a  that was exposed to [facility] dust and debris after 09 11 2001\par \par

## 2022-03-16 NOTE — DISCUSSION/SUMMARY
[Home] : at home, [unfilled] , at the time of the visit. [Medical Office: ([facility])___] : at the medical office located in  [Patient seen for WTC Monitoring ___] : Patient was seen for WTC monitoring [unfilled] [Please See Note in Chart and Documentation in Trial DB] : Please see note in chart and documentation in Trial DB. [FreeTextEntry3] : [facility]  [facility]\par WTC exposure 	 Sep--	 Oct-\par \par         \par  	              Sunday Monday Tuesday Wed Thur Friday	Saturday\par   Sept 9- Sept 15	   	   	   	   	   	   	   \par  Sept 16 - Sept 22	   	 15	 15	 15	 15	 15	 15\par  Sept 23 - Sept 29	 0	 0	 15	 0	 15	 0	 15\par 4 days in 10 2001 15 hours \par  Doing  perimeter security and BB adjacent to pile/pit\par PMH/ PSH LM removal \par Hypothyroid \par HTN\par CHEST TIGHTNESS AND BURNING IN THE CHEST \par  SOUR TASTE IN THE MOUTH \par ALL STARTED 02 2002\par HE SX BECAME WORSE AR 2007 WHEN SHE STARTED TAKING OTC Nexium ONCE A WEEK \par EVENTUALLY SAW GI DOCTOR IN 2014 AND WAS DIAGNOSED WITH GERD  AND STOMACH ULCER \par  SHE IS TREATED WITH OMEPRAZOLE SINCE 03 2016 AND RANITIDINE 02 2018, now on famotidine \par \par \par \par  Non smoker \par a/p WTC MV \par  see Follow up Oc MEd note for details \par

## 2022-03-16 NOTE — HEALTH RISK ASSESSMENT
[Patient reported mammogram was normal] : Patient reported mammogram was normal [Patient reported PAP Smear was normal] : Patient reported PAP Smear was normal [Patient reported colonoscopy was normal] : Patient reported colonoscopy was normal [MammogramDate] : 2/2022 [PapSmearDate] : 2022 [ColonoscopyDate] : 2/2021

## 2022-03-16 NOTE — HISTORY OF PRESENT ILLNESS
[Home] : at home, [unfilled] , at the time of the visit. [Medical Office: ([facility])___] : at the medical office located in  [FreeTextEntry1] : heartburn \par taking OMEPRazole 40 mg daily \par  also PRN famotidine 20 mg bId \par  still has reflux though much  better controlled with meds\par  no abd pain \par  no dysphagia \par  had EGD 10 02 2018\par no dysphagia \par \par

## 2022-03-16 NOTE — DISCUSSION/SUMMARY
[Home] : at home, [unfilled] , at the time of the visit. [Medical Office: ([facility])___] : at the medical office located in  [Patient seen for WTC Monitoring ___] : Patient was seen for WTC monitoring [unfilled] [Please See Note in Chart and Documentation in Trial DB] : Please see note in chart and documentation in Trial DB. 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

## 2022-03-17 ENCOUNTER — APPOINTMENT (OUTPATIENT)
Dept: ANTEPARTUM | Facility: CLINIC | Age: 52
End: 2022-03-17
Payer: COMMERCIAL

## 2022-03-17 ENCOUNTER — ASOB RESULT (OUTPATIENT)
Age: 52
End: 2022-03-17

## 2022-03-17 ENCOUNTER — APPOINTMENT (OUTPATIENT)
Dept: OBGYN | Facility: CLINIC | Age: 52
End: 2022-03-17
Payer: COMMERCIAL

## 2022-03-17 VITALS
WEIGHT: 174 LBS | BODY MASS INDEX: 27.31 KG/M2 | HEIGHT: 67 IN | SYSTOLIC BLOOD PRESSURE: 132 MMHG | DIASTOLIC BLOOD PRESSURE: 82 MMHG

## 2022-03-17 LAB — CYTOLOGY CVX/VAG DOC THIN PREP: ABNORMAL

## 2022-03-17 PROCEDURE — 99213 OFFICE O/P EST LOW 20 MIN: CPT | Mod: 25

## 2022-03-17 PROCEDURE — 76830 TRANSVAGINAL US NON-OB: CPT

## 2022-03-17 PROCEDURE — 76857 US EXAM PELVIC LIMITED: CPT | Mod: 59

## 2022-03-17 RX ORDER — FAMOTIDINE 20 MG/1
20 TABLET, FILM COATED ORAL
Qty: 60 | Refills: 3 | Status: ACTIVE | COMMUNITY
Start: 2021-02-19 | End: 1900-01-01

## 2022-03-17 NOTE — HISTORY OF PRESENT ILLNESS
[HPV test offered] : HPV test offered [N] : Patient does not use contraception [Y] : Positive pregnancy history [Ultrasound] : ultrasound [Menarche Age: ____] : age at menarche was [unfilled] [No] : Patient does not have concerns regarding sex [Currently Active] : currently active [Men] : men [TextBox_4] : Pt presents today for a Sonogram Visit. [Mammogramdate] : 01/31/2020 [TextBox_19] : BR2 [BreastSonogramDate] : 01/31/2020 [TextBox_25] : BR2 [PapSmeardate] : 02/24/2022 [TextBox_31] : Negative [HPVDate] : 02/24/2022 [TextBox_78] : Negative [LMPDate] : 03/14/2022 [PGHxAbortions] : 1 [PGxTotal] : 1 [[facility] : 0 [TextBox_27] : 02/06/2020 [FreeTextEntry1] : 03/14/2022

## 2022-03-17 NOTE — DISCUSSION/SUMMARY
[FreeTextEntry1] : -Uterine leiomyomata-\par 1) Pelvic sono today: Anteverted uterus measuring 15.09 cm. 5 uterine leiomyomas visualized measuring from 4.62 cm to 7.06 cm. EMS: 12.5 mm (currently on menses). Right ovary with complex cyst measuring 2.22 cm. Left ovary not visualized. No FF. Results were discussed.\par 2) Will repeat pelvic sono in 8 weeks to re-assess complex right ovarian cyst. \par \par -Pap smear in 2/2022 was significant for endometrial cells, otherwise NFM, HPV HR negative- Patient  states that she was finishing her menses the week her pap was collected. Denies any h/o AUB or HMB.\par 1) Recommended repeat pap remote from menses. She is currently on menses, thus unable to collect her pap today. She will return in 2 weeks for repeat pap. \par 2) We discussed the need for endometrial biopsy if she has persistent endometrial cells on her pap.\par \par -Follow up in 2 weeks for repeat pap.\par \par All questions and concerns were discussed.

## 2022-03-28 ENCOUNTER — APPOINTMENT (OUTPATIENT)
Dept: OBGYN | Facility: CLINIC | Age: 52
End: 2022-03-28
Payer: COMMERCIAL

## 2022-03-28 VITALS
BODY MASS INDEX: 27.31 KG/M2 | WEIGHT: 174 LBS | HEIGHT: 67 IN | DIASTOLIC BLOOD PRESSURE: 70 MMHG | SYSTOLIC BLOOD PRESSURE: 118 MMHG

## 2022-03-28 PROCEDURE — 99213 OFFICE O/P EST LOW 20 MIN: CPT

## 2022-03-28 NOTE — HISTORY OF PRESENT ILLNESS
[N] : Patient does not use contraception [Y] : Positive pregnancy history [Menarche Age: ____] : age at menarche was [unfilled] [Currently Active] : currently active [No] : No [Mammogramdate] : 01/31/20 [TextBox_19] : BR1 [BreastSonogramDate] : 01/31/20 [TextBox_25] : BR2 [PapSmeardate] : 02/24/22 [TextBox_31] : NEG [GonorrheaDate] : 02/24/22 [TextBox_63] : NEG [ChlamydiaDate] : 02/24/22 [TextBox_68] : NEG [HPVDate] : 02/24/22 [TextBox_78] : NEG [LMPDate] : 03/14/22 [PGxTotal] : 1 [PGHxAbortions] : 1 [[facility] : 0 [FreeTextEntry1] : 03/14/22

## 2022-03-28 NOTE — PHYSICAL EXAM
[Chaperone Present] : A chaperone was present in the examining room during all aspects of the physical examination [FreeTextEntry1] : KRYSTIN CARDONA [Appropriately responsive] : appropriately responsive [Alert] : alert [No Acute Distress] : no acute distress [Oriented x3] : oriented x3 [Labia Majora] : normal [Labia Minora] : normal [Normal] : normal [Pink Rugae] : pink rugae [Discharge] : a  ~M vaginal discharge was present [Moderate] : moderate [Foul Smelling] : not foul smelling [White] : white [Thin] : thin [No Bleeding] : There was no active vaginal bleeding

## 2022-03-28 NOTE — DISCUSSION/SUMMARY
[FreeTextEntry1] : -Pap smear in 2/2022 was significant for endometrial cells, otherwise NFM, HPV HR negative- Patient states that she was finishing her menses the week her pap was collected. Denies any h/o AUB or HMB.\par 1) Repeat pap collected today.\par 2) We discussed the need for endometrial biopsy if she has persistent endometrial cells on her pap remote from menses.\par \par -Vaginal discharge- GC/Chl and Affirm collected to r/o vaginitis.\par \par -h/o complex right ovarian cyst- Measuring 2.22 cm on pelvic sono on 3/17/22. Will repeat pelvic sono in 8 weeks to re-assess ovarian cyst.\par \par -Follow up in 8 weeks with pelvic sono.\par \par All questions and concerns were discussed.

## 2022-04-01 ENCOUNTER — NON-APPOINTMENT (OUTPATIENT)
Age: 52
End: 2022-04-01

## 2022-04-01 LAB
C TRACH RRNA SPEC QL NAA+PROBE: NOT DETECTED
CANDIDA VAG CYTO: NOT DETECTED
G VAGINALIS+PREV SP MTYP VAG QL MICRO: DETECTED
HPV HIGH+LOW RISK DNA PNL CVX: NOT DETECTED
N GONORRHOEA RRNA SPEC QL NAA+PROBE: NOT DETECTED
SOURCE AMPLIFICATION: NORMAL
SOURCE TP AMPLIFICATION: NORMAL
T VAGINALIS RRNA SPEC QL NAA+PROBE: NOT DETECTED
T VAGINALIS VAG QL WET PREP: NOT DETECTED

## 2022-04-06 ENCOUNTER — NON-APPOINTMENT (OUTPATIENT)
Age: 52
End: 2022-04-06

## 2022-04-06 LAB — CYTOLOGY CVX/VAG DOC THIN PREP: NORMAL

## 2022-06-14 ENCOUNTER — APPOINTMENT (OUTPATIENT)
Dept: ANTEPARTUM | Facility: CLINIC | Age: 52
End: 2022-06-14
Payer: COMMERCIAL

## 2022-06-14 ENCOUNTER — ASOB RESULT (OUTPATIENT)
Age: 52
End: 2022-06-14

## 2022-06-14 ENCOUNTER — APPOINTMENT (OUTPATIENT)
Dept: OBGYN | Facility: CLINIC | Age: 52
End: 2022-06-14
Payer: COMMERCIAL

## 2022-06-14 VITALS
DIASTOLIC BLOOD PRESSURE: 80 MMHG | HEIGHT: 67 IN | BODY MASS INDEX: 27.31 KG/M2 | SYSTOLIC BLOOD PRESSURE: 118 MMHG | WEIGHT: 174 LBS

## 2022-06-14 PROCEDURE — 99212 OFFICE O/P EST SF 10 MIN: CPT

## 2022-06-14 PROCEDURE — 76857 US EXAM PELVIC LIMITED: CPT | Mod: 59

## 2022-06-14 PROCEDURE — 76830 TRANSVAGINAL US NON-OB: CPT

## 2022-06-21 NOTE — DISCUSSION/SUMMARY
[FreeTextEntry1] : Pt to RTO annual yearly, sooner if needed.  All the pt's questions and concerns were addressed.

## 2022-06-21 NOTE — HISTORY OF PRESENT ILLNESS
[N] : Patient does not use contraception [Y] : Positive pregnancy history [Menarche Age: ____] : age at menarche was [unfilled] [No] : Patient does not have concerns regarding sex [Currently Active] : currently active [Mammogramdate] : 01/31/20 [TextBox_19] : BR1 [BreastSonogramDate] : 01/31/20 [TextBox_25] : BR2 [PapSmeardate] : 03/28/22 [TextBox_31] : NEG [GonorrheaDate] : 03/28/22 [TextBox_63] : NEG [ChlamydiaDate] : 03/28/22 [TextBox_68] : NEG [TrichomonasDate] : 03/28/22 [TextBox_73] : NEG [HPVDate] : 03/28/22 [TextBox_78] : NEG [LMPDate] : 05/27/22 [PGxTotal] : 1 [PGHxAbortions] : 1 [FreeTextEntry1] : 05/27/22

## 2022-06-30 NOTE — REVIEW OF SYSTEMS
[see HPI] : see HPI Detail Level: Zone Render In Strict Bullet Format?: Yes Initiate Treatment: Triamcinolone: apply to affected areas twice daily for two weeks then take a one week break. Continue as needed.

## 2022-07-11 ENCOUNTER — RX RENEWAL (OUTPATIENT)
Age: 52
End: 2022-07-11

## 2022-11-02 RX ORDER — PANTOPRAZOLE 40 MG/1
40 TABLET, DELAYED RELEASE ORAL
Qty: 30 | Refills: 3 | Status: ACTIVE | COMMUNITY
Start: 2022-03-16 | End: 1900-01-01

## 2023-01-17 ENCOUNTER — APPOINTMENT (OUTPATIENT)
Dept: ORTHOPEDIC SURGERY | Facility: CLINIC | Age: 53
End: 2023-01-17
Payer: COMMERCIAL

## 2023-01-17 VITALS — BODY MASS INDEX: 25.9 KG/M2 | HEIGHT: 67 IN | WEIGHT: 165 LBS

## 2023-01-17 DIAGNOSIS — M65.30 TRIGGER FINGER, UNSPECIFIED FINGER: ICD-10-CM

## 2023-01-17 DIAGNOSIS — I10 ESSENTIAL (PRIMARY) HYPERTENSION: ICD-10-CM

## 2023-01-17 PROCEDURE — 73110 X-RAY EXAM OF WRIST: CPT | Mod: LT

## 2023-01-17 PROCEDURE — 99214 OFFICE O/P EST MOD 30 MIN: CPT | Mod: 25

## 2023-01-17 PROCEDURE — 20550 NJX 1 TENDON SHEATH/LIGAMENT: CPT

## 2023-01-17 NOTE — IMAGING
[de-identified] : Left hand with no swelling or erythema. Able to make fist, oppose thumb to small finger with good thenar bulk. No intrinsic atrophy. Tender along distal palmar crease, most notable at thumb, +ttp at thumb CMC. No overt locking with catching palpable. Sensation intact throughout with <2sec cap refill. \par \par Left wrist radiograph with no facture or dislocation. Thumb CMC arthrosis.

## 2023-01-17 NOTE — ASSESSMENT
[FreeTextEntry1] : Left thumb trigger - Discussed with patient the pathoanatomy of trigger and options going forward. Risks/benefits discussed as well as natural progression that injection will provide some relief but symptoms may recur. Discussed that pain may worsen in coming days but will subside. Discussed that we can repeat an injection or that operative intervention may be indicated down the line.\par \par Procedure 1 - 0.5cc 1%lidocaine + 0.5cc 40mg/cc Kenalog administered to left thumb A1 pulley following sterilization with Betadine. Patient tolerated this well.\par \par F/u 3 months

## 2023-01-17 NOTE — HISTORY OF PRESENT ILLNESS
[de-identified] : 52F, RHD, PMHX of HTN presents with left thumb/wrist pain since 1/10/23. Patient reports came on suddenly. Denies injury/trauma. Admits to clicking, locking sticking of left thumb. Denies outside imaging/treatment. Interimttent tingling. Denies bracing. Denies ICE/HEAT

## 2023-02-27 ENCOUNTER — NON-APPOINTMENT (OUTPATIENT)
Age: 53
End: 2023-02-27

## 2023-02-28 ENCOUNTER — APPOINTMENT (OUTPATIENT)
Dept: OBGYN | Facility: CLINIC | Age: 53
End: 2023-02-28
Payer: COMMERCIAL

## 2023-02-28 ENCOUNTER — RESULT CHARGE (OUTPATIENT)
Age: 53
End: 2023-02-28

## 2023-02-28 VITALS
HEIGHT: 67 IN | SYSTOLIC BLOOD PRESSURE: 134 MMHG | WEIGHT: 166 LBS | BODY MASS INDEX: 26.06 KG/M2 | DIASTOLIC BLOOD PRESSURE: 92 MMHG

## 2023-02-28 PROCEDURE — 82270 OCCULT BLOOD FECES: CPT

## 2023-02-28 PROCEDURE — 99396 PREV VISIT EST AGE 40-64: CPT

## 2023-03-01 NOTE — PHYSICAL EXAM
[Appropriately responsive] : appropriately responsive [Alert] : alert [No Acute Distress] : no acute distress [Oriented x3] : oriented x3 [Examination Of The Breasts] : a normal appearance [No Discharge] : no discharge [No Masses] : no breast masses were palpable [No Lesions] : no lesions  [Labia Majora] : normal [Labia Minora] : normal [Pink Rugae] : pink rugae [Scant] : There was scant vaginal bleeding [Normal Position] : in a normal position [Uterine Adnexae] : normal [Normal Brown Stool] : was normal and brown [Normal] : was normal [None] : there was no rectal abscess [Excoriation] : no perianal excoriation [Fistula] : no fistulas [Wart] : no warts [Occult Blood Positive] : was negative for occult blood analysis [Gross Blood] : no gross blood

## 2023-03-01 NOTE — DISCUSSION/SUMMARY
[FreeTextEntry1] : Pt will schedule DEXA - pending results any further recommendations.  Pt to RTO annual yearly, sooner if needed.  All the pt's questions and concerns were addressed.

## 2023-03-01 NOTE — HISTORY OF PRESENT ILLNESS
[perimenopausal] : perimenopausal [N] : Patient does not use contraception [Y] : Positive pregnancy history [Menarche Age: ____] : age at menarche was [unfilled] [Currently Active] : currently active [Mammogramdate] : 1/31/20 [TextBox_19] : br1 [BreastSonogramDate] : 1/31/20 [TextBox_25] : br1 [PapSmeardate] : 3/28/22 [TextBox_31] : neg [HPVDate] : 3/28/22 [TextBox_78] : neg [LMPDate] : 1/26/23 [PGxTotal] : 1 [[facility] : 0 [PGHxABInduced] : 1 [FreeTextEntry1] : 1/26/23

## 2023-03-06 LAB — CYTOLOGY CVX/VAG DOC THIN PREP: NORMAL

## 2023-07-10 ENCOUNTER — APPOINTMENT (OUTPATIENT)
Dept: ANTEPARTUM | Facility: CLINIC | Age: 53
End: 2023-07-10
Payer: COMMERCIAL

## 2023-07-10 ENCOUNTER — ASOB RESULT (OUTPATIENT)
Age: 53
End: 2023-07-10

## 2023-07-10 PROCEDURE — 76830 TRANSVAGINAL US NON-OB: CPT

## 2024-03-20 ENCOUNTER — APPOINTMENT (OUTPATIENT)
Dept: OBGYN | Facility: CLINIC | Age: 54
End: 2024-03-20
Payer: COMMERCIAL

## 2024-03-20 VITALS
DIASTOLIC BLOOD PRESSURE: 70 MMHG | HEIGHT: 67 IN | SYSTOLIC BLOOD PRESSURE: 130 MMHG | BODY MASS INDEX: 28.09 KG/M2 | WEIGHT: 179 LBS

## 2024-03-20 DIAGNOSIS — Z30.09 ENCOUNTER FOR OTHER GENERAL COUNSELING AND ADVICE ON CONTRACEPTION: ICD-10-CM

## 2024-03-20 DIAGNOSIS — B96.89 ACUTE VAGINITIS: ICD-10-CM

## 2024-03-20 DIAGNOSIS — Z11.51 ENCOUNTER FOR SCREENING FOR HUMAN PAPILLOMAVIRUS (HPV): ICD-10-CM

## 2024-03-20 DIAGNOSIS — Z92.89 PERSONAL HISTORY OF OTHER MEDICAL TREATMENT: ICD-10-CM

## 2024-03-20 DIAGNOSIS — Z12.4 ENCOUNTER FOR SCREENING FOR MALIGNANT NEOPLASM OF CERVIX: ICD-10-CM

## 2024-03-20 DIAGNOSIS — Z23 ENCOUNTER FOR IMMUNIZATION: ICD-10-CM

## 2024-03-20 DIAGNOSIS — R87.618 OTHER ABNORMAL CYTOLOGICAL FINDINGS ON SPECIMENS FROM CERVIX UTERI: ICD-10-CM

## 2024-03-20 DIAGNOSIS — N76.0 ACUTE VAGINITIS: ICD-10-CM

## 2024-03-20 DIAGNOSIS — Z04.9 ENCOUNTER FOR EXAMINATION AND OBSERVATION FOR UNSPECIFIED REASON: ICD-10-CM

## 2024-03-20 DIAGNOSIS — Z01.411 ENCOUNTER FOR GYNECOLOGICAL EXAMINATION (GENERAL) (ROUTINE) WITH ABNORMAL FINDINGS: ICD-10-CM

## 2024-03-20 DIAGNOSIS — Z12.11 ENCOUNTER FOR SCREENING FOR MALIGNANT NEOPLASM OF COLON: ICD-10-CM

## 2024-03-20 DIAGNOSIS — Z87.42 PERSONAL HISTORY OF OTHER DISEASES OF THE FEMALE GENITAL TRACT: ICD-10-CM

## 2024-03-20 DIAGNOSIS — Z12.39 ENCOUNTER FOR OTHER SCREENING FOR MALIGNANT NEOPLASM OF BREAST: ICD-10-CM

## 2024-03-20 DIAGNOSIS — Z12.31 ENCOUNTER FOR SCREENING MAMMOGRAM FOR MALIGNANT NEOPLASM OF BREAST: ICD-10-CM

## 2024-03-20 DIAGNOSIS — Z78.9 OTHER SPECIFIED HEALTH STATUS: ICD-10-CM

## 2024-03-20 DIAGNOSIS — Z01.419 ENCOUNTER FOR GYNECOLOGICAL EXAMINATION (GENERAL) (ROUTINE) W/OUT ABNORMAL FINDINGS: ICD-10-CM

## 2024-03-20 DIAGNOSIS — N83.299 OTHER OVARIAN CYST, UNSPECIFIED SIDE: ICD-10-CM

## 2024-03-20 DIAGNOSIS — R92.30 DENSE BREASTS, UNSPECIFIED: ICD-10-CM

## 2024-03-20 DIAGNOSIS — N83.209 UNSPECIFIED OVARIAN CYST, UNSPECIFIED SIDE: ICD-10-CM

## 2024-03-20 LAB
HCG UR QL: NEGATIVE
QUALITY CONTROL: YES

## 2024-03-20 PROCEDURE — 99213 OFFICE O/P EST LOW 20 MIN: CPT | Mod: 25

## 2024-03-20 PROCEDURE — 99396 PREV VISIT EST AGE 40-64: CPT

## 2024-03-20 PROCEDURE — 81025 URINE PREGNANCY TEST: CPT

## 2024-03-20 RX ORDER — METRONIDAZOLE 7.5 MG/G
0.75 GEL VAGINAL
Qty: 1 | Refills: 0 | Status: DISCONTINUED | COMMUNITY
Start: 2022-04-01 | End: 2024-03-20

## 2024-03-21 LAB — HPV HIGH+LOW RISK DNA PNL CVX: NOT DETECTED

## 2024-03-24 PROBLEM — Z78.9 SOCIAL ALCOHOL USE: Status: ACTIVE | Noted: 2024-03-20

## 2024-03-24 RX ORDER — PNV NO.95/FERROUS FUM/FOLIC AC 28MG-0.8MG
TABLET ORAL
Refills: 0 | Status: ACTIVE | COMMUNITY

## 2024-03-24 NOTE — PHYSICAL EXAM
[Chaperone Present] : A chaperone was present in the examining room during all aspects of the physical examination [Appropriately responsive] : appropriately responsive [Alert] : alert [No Acute Distress] : no acute distress [Soft] : soft [Non-tender] : non-tender [Non-distended] : non-distended [Oriented x3] : oriented x3 [No Discharge] : no discharge [Labia Majora] : normal [Labia Minora] : normal [No Bleeding] : There was no active vaginal bleeding [Normal] : normal [Uterine Adnexae] : normal [Atrophy] : atrophy [Dry Mucosa] : dry mucosa [FreeTextEntry6] : 18-20 weeks sized uterus.  [Enlarged ___ wks] : enlarged [unfilled] ~Uweeks

## 2024-03-24 NOTE — HISTORY OF PRESENT ILLNESS
[N] : Patient does not use contraception [Y] : Patient is sexually active [Currently Active] : currently active [Men] : men [No] : No [Patient reported colonoscopy was normal] : Patient reported colonoscopy was normal [Menarche Age: ____] : age at menarche was [unfilled] [Mammogramdate] : 2/12/24 [TextBox_19] : br 1 [BreastSonogramDate] : 2/12/2024 [PapSmeardate] : 2/28/23 [TextBox_25] : br 1 [TextBox_31] : Negative  [BoneDensityDate] : 3/6/23 [TextBox_37] : normal [TextBox_43] : Return 5 years.  [ColonoscopyDate] : 2/2021 [PGxTotal] : 1 [LMPDate] : 2/13/2024 [PGHxFullTerm] : 0 [PGHxAbortions] : 1 [FreeTextEntry1] : 2/13/2024 [[facility]] : 0

## 2024-03-24 NOTE — END OF VISIT
[FreeTextEntry3] : I, Mirta Kraus solely acted as scribe for Dr. Naina Payne on 03/20/2024  All medical entries made by the Scribe were at my, Dr. Marinelli, direction and personally dictated by me on 03/20/2024 . I have reviewed the chart and agree that the record accurately reflects my personal performance of the history, physical exam, assessment and plan. I have also personally directed, reviewed, and agreed with the chart.

## 2024-03-24 NOTE — PLAN
[FreeTextEntry1] : Pap done today.  Prescription for mammogram screening and breast US given.  Pelvic sono ordered to F/U fibroids.  Self-breast exam reviewed.  She will follow up annually and as needed.

## 2024-03-26 LAB — CYTOLOGY CVX/VAG DOC THIN PREP: NORMAL

## 2024-06-18 ENCOUNTER — APPOINTMENT (OUTPATIENT)
Dept: ANTEPARTUM | Facility: CLINIC | Age: 54
End: 2024-06-18
Payer: COMMERCIAL

## 2024-06-18 ENCOUNTER — ASOB RESULT (OUTPATIENT)
Age: 54
End: 2024-06-18

## 2024-06-18 ENCOUNTER — APPOINTMENT (OUTPATIENT)
Dept: OBGYN | Facility: CLINIC | Age: 54
End: 2024-06-18

## 2024-06-18 ENCOUNTER — NON-APPOINTMENT (OUTPATIENT)
Age: 54
End: 2024-06-18

## 2024-06-18 VITALS
BODY MASS INDEX: 27.78 KG/M2 | SYSTOLIC BLOOD PRESSURE: 120 MMHG | WEIGHT: 177 LBS | HEIGHT: 67 IN | DIASTOLIC BLOOD PRESSURE: 78 MMHG

## 2024-06-18 DIAGNOSIS — D25.9 LEIOMYOMA OF UTERUS, UNSPECIFIED: ICD-10-CM

## 2024-06-18 PROCEDURE — 76830 TRANSVAGINAL US NON-OB: CPT

## 2024-06-18 PROCEDURE — 99213 OFFICE O/P EST LOW 20 MIN: CPT

## 2024-06-18 PROCEDURE — 76857 US EXAM PELVIC LIMITED: CPT | Mod: 59

## 2024-06-23 PROBLEM — D25.9 FIBROID UTERUS: Status: ACTIVE | Noted: 2019-01-04

## 2024-06-23 PROBLEM — D25.9 UTERINE LEIOMYOMA: Status: ACTIVE | Noted: 2022-03-17

## 2024-06-25 NOTE — HISTORY OF PRESENT ILLNESS
[N] : Patient does not use contraception [Y] : Positive pregnancy history [Menarche Age: ____] : age at menarche was [unfilled] [No] : Patient does not have concerns regarding sex [Currently Active] : currently active [Men] : men [Mammogramdate] : 02/12/2024 [TextBox_19] : BR1 [BreastSonogramDate] : 02/12/2024 [TextBox_25] : BR1 [PapSmeardate] : 03/20/2024 [TextBox_31] : NEGATIVE [BoneDensityDate] : 03/06/2023 [TextBox_37] : NORMAL [ColonoscopyDate] : 02/2021 [HPVDate] : 03/20/2024 [TextBox_78] : NEGATIVE [LMPDate] : 04/15/2024 [PGxTotal] : 1 [[facility]] : 0 [PGHxABInduced] : 1 [FreeTextEntry1] : 04/15/2024

## 2024-10-17 ENCOUNTER — APPOINTMENT (OUTPATIENT)
Dept: OBGYN | Facility: CLINIC | Age: 54
End: 2024-10-17

## 2024-10-17 VITALS
SYSTOLIC BLOOD PRESSURE: 108 MMHG | DIASTOLIC BLOOD PRESSURE: 62 MMHG | HEIGHT: 67 IN | BODY MASS INDEX: 27.47 KG/M2 | WEIGHT: 175 LBS

## 2024-10-17 PROCEDURE — 99213 OFFICE O/P EST LOW 20 MIN: CPT

## 2024-10-30 PROBLEM — N92.6 IRREGULAR MENSES: Status: ACTIVE | Noted: 2024-10-30

## 2024-10-30 PROBLEM — Z78.0 HISTORY OF MENOPAUSE: Status: RESOLVED | Noted: 2021-02-09 | Resolved: 2024-10-30

## 2024-10-30 PROBLEM — N92.6 IRREGULAR MENSTRUAL CYCLE: Status: ACTIVE | Noted: 2024-10-30

## 2024-10-30 PROBLEM — N95.1 PERIMENOPAUSAL: Status: ACTIVE | Noted: 2024-10-30

## 2025-03-25 ENCOUNTER — APPOINTMENT (OUTPATIENT)
Dept: OBGYN | Facility: CLINIC | Age: 55
End: 2025-03-25

## 2025-03-25 VITALS
DIASTOLIC BLOOD PRESSURE: 72 MMHG | WEIGHT: 185.25 LBS | HEIGHT: 67 IN | BODY MASS INDEX: 29.08 KG/M2 | SYSTOLIC BLOOD PRESSURE: 124 MMHG

## 2025-03-25 DIAGNOSIS — Z11.51 ENCOUNTER FOR SCREENING FOR HUMAN PAPILLOMAVIRUS (HPV): ICD-10-CM

## 2025-03-25 DIAGNOSIS — Z12.4 ENCOUNTER FOR SCREENING FOR MALIGNANT NEOPLASM OF CERVIX: ICD-10-CM

## 2025-03-25 DIAGNOSIS — D25.9 LEIOMYOMA OF UTERUS, UNSPECIFIED: ICD-10-CM

## 2025-03-25 LAB
APPEARANCE: CLEAR
BILIRUBIN URINE: NEGATIVE
BLOOD URINE: ABNORMAL
CARD LOT #: NORMAL
CARD LOT EXP DATE: NORMAL
COLOR: YELLOW
DATE COLLECTED: NORMAL
DEVELOPER LOT #: NORMAL
DEVELOPER LOT EXP DATE: NORMAL
GLUCOSE QUALITATIVE U: NEGATIVE
HEMOCCULT SP1 STL QL: NEGATIVE
KETONES URINE: NEGATIVE
LEUKOCYTE ESTERASE URINE: NEGATIVE
NITRITE URINE: NEGATIVE
PH URINE: 7
PROTEIN URINE: NEGATIVE
QUALITY CONTROL: NORMAL
SPECIFIC GRAVITY URINE: <=1.005
UROBILINOGEN URINE: 0.2 (ref 0.2–?)

## 2025-03-25 PROCEDURE — 99459 PELVIC EXAMINATION: CPT | Mod: NC

## 2025-03-25 PROCEDURE — 99396 PREV VISIT EST AGE 40-64: CPT

## 2025-03-25 PROCEDURE — 82270 OCCULT BLOOD FECES: CPT

## 2025-03-27 LAB — HPV HIGH+LOW RISK DNA PNL CVX: NOT DETECTED

## 2025-03-30 LAB — CYTOLOGY CVX/VAG DOC THIN PREP: NORMAL

## 2025-03-31 ENCOUNTER — ASOB RESULT (OUTPATIENT)
Age: 55
End: 2025-03-31

## 2025-03-31 ENCOUNTER — APPOINTMENT (OUTPATIENT)
Dept: ANTEPARTUM | Facility: CLINIC | Age: 55
End: 2025-03-31
Payer: COMMERCIAL

## 2025-03-31 PROCEDURE — 76830 TRANSVAGINAL US NON-OB: CPT

## 2025-03-31 PROCEDURE — 76856 US EXAM PELVIC COMPLETE: CPT | Mod: 59

## 2025-04-04 ENCOUNTER — NON-APPOINTMENT (OUTPATIENT)
Age: 55
End: 2025-04-04

## 2025-04-07 PROBLEM — Z12.11 SCREENING FOR COLON CANCER: Status: ACTIVE | Noted: 2025-04-07

## 2025-04-07 PROBLEM — Z01.419 ENCOUNTER FOR GYNECOLOGICAL EXAMINATION WITH PAPANICOLAOU SMEAR OF CERVIX: Status: ACTIVE | Noted: 2025-04-07

## 2025-05-20 ENCOUNTER — APPOINTMENT (OUTPATIENT)
Dept: OBGYN | Facility: CLINIC | Age: 55
End: 2025-05-20
Payer: COMMERCIAL

## 2025-05-20 ENCOUNTER — NON-APPOINTMENT (OUTPATIENT)
Age: 55
End: 2025-05-20

## 2025-05-20 PROCEDURE — ZZZZZ: CPT

## 2025-05-28 ENCOUNTER — APPOINTMENT (OUTPATIENT)
Dept: OBGYN | Facility: CLINIC | Age: 55
End: 2025-05-28
Payer: COMMERCIAL

## 2025-05-28 VITALS
HEIGHT: 67 IN | OXYGEN SATURATION: 98 % | WEIGHT: 182.25 LBS | RESPIRATION RATE: 18 BRPM | SYSTOLIC BLOOD PRESSURE: 153 MMHG | TEMPERATURE: 98.8 F | BODY MASS INDEX: 28.61 KG/M2 | DIASTOLIC BLOOD PRESSURE: 97 MMHG | HEART RATE: 87 BPM

## 2025-05-28 DIAGNOSIS — D25.9 LEIOMYOMA OF UTERUS, UNSPECIFIED: ICD-10-CM

## 2025-05-28 DIAGNOSIS — Z12.11 ENCOUNTER FOR SCREENING FOR MALIGNANT NEOPLASM OF COLON: ICD-10-CM

## 2025-05-28 DIAGNOSIS — Z12.4 ENCOUNTER FOR SCREENING FOR MALIGNANT NEOPLASM OF CERVIX: ICD-10-CM

## 2025-05-28 DIAGNOSIS — Z01.419 ENCOUNTER FOR GYNECOLOGICAL EXAMINATION (GENERAL) (ROUTINE) W/OUT ABNORMAL FINDINGS: ICD-10-CM

## 2025-05-28 DIAGNOSIS — N93.8 OTHER SPECIFIED ABNORMAL UTERINE AND VAGINAL BLEEDING: ICD-10-CM

## 2025-05-28 DIAGNOSIS — D25.0 SUBMUCOUS LEIOMYOMA OF UTERUS: ICD-10-CM

## 2025-05-28 LAB
HCG UR QL: NEGATIVE
QUALITY CONTROL: YES

## 2025-05-28 PROCEDURE — 81025 URINE PREGNANCY TEST: CPT

## 2025-05-28 PROCEDURE — 58558Z: CUSTOM

## 2025-05-29 ENCOUNTER — NON-APPOINTMENT (OUTPATIENT)
Age: 55
End: 2025-05-29

## 2025-05-30 PROBLEM — Z12.4 CERVICAL CANCER SCREENING: Status: RESOLVED | Noted: 2025-03-25 | Resolved: 2025-05-30

## 2025-05-30 PROBLEM — Z01.419 ENCOUNTER FOR GYNECOLOGICAL EXAMINATION WITH PAPANICOLAOU SMEAR OF CERVIX: Status: RESOLVED | Noted: 2025-04-07 | Resolved: 2025-05-30

## 2025-06-02 ENCOUNTER — NON-APPOINTMENT (OUTPATIENT)
Age: 55
End: 2025-06-02

## 2025-06-02 LAB — CORE LAB BIOPSY: NORMAL

## 2025-06-13 ENCOUNTER — APPOINTMENT (OUTPATIENT)
Dept: OBGYN | Facility: CLINIC | Age: 55
End: 2025-06-13

## 2025-06-18 ENCOUNTER — APPOINTMENT (OUTPATIENT)
Dept: ORTHOPEDIC SURGERY | Facility: CLINIC | Age: 55
End: 2025-06-18
Payer: COMMERCIAL

## 2025-06-18 VITALS — WEIGHT: 182 LBS | BODY MASS INDEX: 28.56 KG/M2 | HEIGHT: 67 IN

## 2025-06-18 PROCEDURE — 20550 NJX 1 TENDON SHEATH/LIGAMENT: CPT | Mod: 59,F5

## 2025-06-18 PROCEDURE — 73130 X-RAY EXAM OF HAND: CPT | Mod: RT

## 2025-06-18 PROCEDURE — 99213 OFFICE O/P EST LOW 20 MIN: CPT | Mod: 25

## 2025-06-18 PROCEDURE — 99203 OFFICE O/P NEW LOW 30 MIN: CPT | Mod: 25

## 2025-07-16 ENCOUNTER — APPOINTMENT (OUTPATIENT)
Dept: ORTHOPEDIC SURGERY | Facility: CLINIC | Age: 55
End: 2025-07-16
Payer: COMMERCIAL

## 2025-07-16 PROCEDURE — 99213 OFFICE O/P EST LOW 20 MIN: CPT
